# Patient Record
Sex: FEMALE | Race: BLACK OR AFRICAN AMERICAN | NOT HISPANIC OR LATINO | Employment: OTHER | URBAN - METROPOLITAN AREA
[De-identification: names, ages, dates, MRNs, and addresses within clinical notes are randomized per-mention and may not be internally consistent; named-entity substitution may affect disease eponyms.]

---

## 2021-10-11 ENCOUNTER — NEW PATIENT COMPREHENSIVE (OUTPATIENT)
Dept: URBAN - METROPOLITAN AREA CLINIC 9 | Facility: CLINIC | Age: 66
End: 2021-10-11

## 2021-10-11 DIAGNOSIS — H35.363: ICD-10-CM

## 2021-10-11 DIAGNOSIS — H52.223: ICD-10-CM

## 2021-10-11 DIAGNOSIS — H25.13: ICD-10-CM

## 2021-10-11 DIAGNOSIS — H04.123: ICD-10-CM

## 2021-10-11 PROCEDURE — 99204 OFFICE O/P NEW MOD 45 MIN: CPT

## 2021-10-11 PROCEDURE — 92015 DETERMINE REFRACTIVE STATE: CPT

## 2021-10-11 PROCEDURE — 92134 CPTRZ OPH DX IMG PST SGM RTA: CPT

## 2021-10-11 ASSESSMENT — VISUAL ACUITY
OS_GLARE: 20/100
OS_CC: 20/60+2
OD_GLARE: 20/100
OD_CC: 20/40+1

## 2021-10-11 ASSESSMENT — KERATOMETRY
OD_AXISANGLE2_DEGREES: 150
OD_K2POWER_DIOPTERS: 47
OD_K1POWER_DIOPTERS: 44.25
OD_AXISANGLE_DEGREES: 60

## 2021-10-11 ASSESSMENT — TONOMETRY
OS_IOP_MMHG: 14
OD_IOP_MMHG: 16

## 2021-12-09 ENCOUNTER — PRE-OP/H&P (OUTPATIENT)
Dept: URBAN - METROPOLITAN AREA CLINIC 9 | Facility: CLINIC | Age: 66
End: 2021-12-09

## 2021-12-09 DIAGNOSIS — H52.223: ICD-10-CM

## 2021-12-09 DIAGNOSIS — H18.613: ICD-10-CM

## 2021-12-09 DIAGNOSIS — H25.13: ICD-10-CM

## 2021-12-09 PROCEDURE — 99211PRE PRE OP VISIT

## 2021-12-09 PROCEDURE — 92025 CPTRIZED CORNEAL TOPOGRAPHY: CPT

## 2021-12-09 PROCEDURE — 92136 OPHTHALMIC BIOMETRY: CPT

## 2021-12-09 ASSESSMENT — KERATOMETRY
OD_K2POWER_DIOPTERS: 47.25
OD_K1POWER_DIOPTERS: 44.00
OD_AXISANGLE_DEGREES: 63
OD_AXISANGLE2_DEGREES: 153

## 2021-12-09 ASSESSMENT — VISUAL ACUITY
OS_SC: 20/100+1
OU_SC: J1
OU_SC: 20/80-1
OD_SC: 20/100+1
OD_SC: J2
OS_SC: J1-1

## 2021-12-22 ENCOUNTER — POST OP/EVAL OF SECOND EYE (OUTPATIENT)
Dept: URBAN - METROPOLITAN AREA CLINIC 9 | Facility: CLINIC | Age: 66
End: 2021-12-22

## 2021-12-22 DIAGNOSIS — H25.11: ICD-10-CM

## 2021-12-22 DIAGNOSIS — Z96.1: ICD-10-CM

## 2021-12-22 PROBLEM — Z98.42: Noted: 2021-12-21

## 2021-12-22 PROCEDURE — 99024 POSTOP FOLLOW-UP VISIT: CPT

## 2021-12-22 PROCEDURE — 92136 OPHTHALMIC BIOMETRY: CPT

## 2021-12-22 ASSESSMENT — KERATOMETRY
OS_AXISANGLE2_DEGREES: 71
OS_AXISANGLE_DEGREES: 161
OS_K1POWER_DIOPTERS: 44.50
OS_K2POWER_DIOPTERS: 46.00

## 2021-12-22 ASSESSMENT — TONOMETRY: OS_IOP_MMHG: 11

## 2021-12-22 ASSESSMENT — VISUAL ACUITY
OS_SC: 20/30-2
OS_SC: J2
OU_SC: J1+
OU_SC: 20/30-2

## 2022-02-23 ENCOUNTER — POST-OP (OUTPATIENT)
Dept: URBAN - METROPOLITAN AREA CLINIC 9 | Facility: CLINIC | Age: 67
End: 2022-02-23

## 2022-02-23 DIAGNOSIS — Z98.41: ICD-10-CM

## 2022-02-23 DIAGNOSIS — Z96.1: ICD-10-CM

## 2022-02-23 PROBLEM — Z98.42: Noted: 2021-12-21

## 2022-02-23 PROCEDURE — 99024 POSTOP FOLLOW-UP VISIT: CPT

## 2022-02-23 ASSESSMENT — VISUAL ACUITY
OD_SC: 20/40-1
OU_SC: 20/40-1

## 2022-02-23 ASSESSMENT — TONOMETRY: OD_IOP_MMHG: 17

## 2022-07-02 RX ORDER — OMEPRAZOLE 20 MG/1
CAPSULE, DELAYED RELEASE ORAL
COMMUNITY
Start: 2021-10-12

## 2022-07-02 RX ORDER — ATORVASTATIN CALCIUM 20 MG/1
TABLET, FILM COATED ORAL
COMMUNITY

## 2022-07-02 RX ORDER — LOSARTAN POTASSIUM 100 MG/1
TABLET ORAL
COMMUNITY

## 2022-10-12 NOTE — PATIENT DISCUSSION
Baseline vision, color vision and visual field all normal OU. No signs of optic neuropathy. OK to continue Ethambutol. Follow monthly until finished.

## 2022-11-03 PROBLEM — M17.0 BILATERAL PRIMARY OSTEOARTHRITIS OF KNEE: Status: ACTIVE | Noted: 2022-11-03

## 2022-11-03 PROBLEM — E78.00 PURE HYPERCHOLESTEROLEMIA: Status: ACTIVE | Noted: 2022-11-03

## 2022-11-03 PROBLEM — E11.9 TYPE 2 DIABETES MELLITUS WITHOUT COMPLICATIONS (HCC): Status: ACTIVE | Noted: 2022-11-03

## 2022-11-03 PROBLEM — R01.1 CARDIAC MURMUR, UNSPECIFIED: Status: ACTIVE | Noted: 2022-11-03

## 2022-11-03 PROBLEM — M10.9 GOUT: Status: ACTIVE | Noted: 2022-11-03

## 2022-11-03 PROBLEM — I10 ESSENTIAL HYPERTENSION: Status: ACTIVE | Noted: 2022-11-03

## 2022-11-15 NOTE — PATIENT DISCUSSION
Vision, color vision and visual field all stable OU. No signs of optic neuropathy. OK to continue Ethambutol. Follow monthly until finished.

## 2022-12-19 NOTE — PATIENT DISCUSSION
Vision and visual field all stable OU. No signs of optic neuropathy. OK to continue Ethambutol. Follow monthly until finished.

## 2024-08-30 NOTE — PATIENT DISCUSSION
Patient called in requesting return call to discuss the medication Methylprednisolone tables   Please return call    Patient needs baseline VF and color testing. Then repeat monthly,.